# Patient Record
Sex: MALE | Race: BLACK OR AFRICAN AMERICAN | URBAN - METROPOLITAN AREA
[De-identification: names, ages, dates, MRNs, and addresses within clinical notes are randomized per-mention and may not be internally consistent; named-entity substitution may affect disease eponyms.]

---

## 2017-02-22 ENCOUNTER — HOSPITAL ENCOUNTER (OUTPATIENT)
Dept: LAB | Age: 67
Discharge: HOME | End: 2017-02-22
Payer: MEDICARE

## 2017-02-22 ENCOUNTER — OFFICE VISIT (OUTPATIENT)
Dept: FAMILY MEDICINE CLINIC | Age: 67
End: 2017-02-22

## 2017-02-22 VITALS
TEMPERATURE: 96.1 F | RESPIRATION RATE: 18 BRPM | DIASTOLIC BLOOD PRESSURE: 85 MMHG | SYSTOLIC BLOOD PRESSURE: 147 MMHG | HEART RATE: 110 BPM | BODY MASS INDEX: 21.92 KG/M2 | WEIGHT: 165.4 LBS | HEIGHT: 73 IN | OXYGEN SATURATION: 97 %

## 2017-02-22 DIAGNOSIS — E11.9 CONTROLLED TYPE 2 DIABETES MELLITUS WITHOUT COMPLICATION, WITHOUT LONG-TERM CURRENT USE OF INSULIN (HCC): ICD-10-CM

## 2017-02-22 DIAGNOSIS — E78.00 PURE HYPERCHOLESTEROLEMIA: Primary | ICD-10-CM

## 2017-02-22 LAB — HBA1C MFR BLD HPLC: 5.4 %

## 2017-02-22 PROCEDURE — 85025 COMPLETE CBC W/AUTO DIFF WBC: CPT

## 2017-02-22 PROCEDURE — 80061 LIPID PANEL: CPT

## 2017-02-22 PROCEDURE — 36415 COLL VENOUS BLD VENIPUNCTURE: CPT

## 2017-02-22 RX ORDER — FOLIC ACID 1 MG/1
1 TABLET ORAL DAILY
Qty: 90 TAB | Refills: 3 | Status: SHIPPED | OUTPATIENT
Start: 2017-02-22

## 2017-02-22 NOTE — MR AVS SNAPSHOT
Visit Information Date & Time Provider Department Dept. Phone Encounter #  
 2/22/2017 11:45 AM Precious Pennington MD [facility] [phone] [id number] Follow-up Instructions Return in about 6 months (around 8/22/2017). Upcoming Health Maintenance Date Due  
 EYE EXAM RETINAL OR DILATED Q1 7/1/2015 GLAUCOMA SCREENING Q2Y 7/1/2016 HEMOGLOBIN A1C Q6M 5/22/2017 Pneumococcal 65+ Low/Medium Risk (2 of 2 - PPSV23) 5/24/2017 MEDICARE YEARLY EXAM 5/25/2017 MICROALBUMIN Q1 11/22/2017 LIPID PANEL Q1 11/22/2017 FOOT EXAM Q1 2/22/2018 DTaP/Tdap/Td series (2 - Td) 11/22/2026 COLONOSCOPY 2/22/2027 Allergies as of 2/22/2017  Review Complete On: 2/22/2017 By: Precious Pennington MD  
 No Known Allergies Current Immunizations  Reviewed on 5/24/2016 No immunizations on file. Not reviewed this visit You Were Diagnosed With   
  
 Codes Comments Pure hypercholesterolemia    -  Primary ICD-10-CM: E78.00 ICD-9-CM: 272.0 Controlled type 2 diabetes mellitus without complication, without long-term current use of insulin ([facility]     ICD-10-CM: E11.9 ICD-9-CM: 250.00 Vitals BP  
  
  
  
  
  
 147/85 Vitals History BMI and BSA Data Body Mass Index Body Surface Area  
 21.82 kg/m 2 1.97 m 2 Preferred Pharmacy Pharmacy Name Phone [id number] [address] [phone] Your Updated Medication List  
  
   
This list is accurate as of: 2/22/17 12:51 PM.  Always use your most recent med list.  
  
  
  
  
 anagrelide 0.5 mg capsule Commonly known as:  Hadley Billow Take  by mouth two (2) times a day. aspirin delayed-release 81 mg tablet Take  by mouth daily. cyanocobalamin 1,000 mcg tablet Take  by mouth daily. folic acid 1 mg tablet Commonly known as:  Google Take 1 Tab by mouth daily. furosemide 40 mg tablet Commonly known as:  LASIX TAKE 1-2 TABLET BY MOUTH EVERY DAY AS NEEDED FOR FLUID Iron 325 mg (65 mg iron) tablet Generic drug:  ferrous sulfate Take  by mouth two (2) times a day. lovastatin 20 mg tablet Commonly known as:  MEVACOR Take 1 Tab by mouth daily. For cholesterol  
  
 metFORMIN 500 mg tablet Commonly known as:  GLUCOPHAGE Take 1 Tab by mouth two (2) times daily (with meals). For diabetes PHENobarbital 64.8 mg tablet Commonly known as:  LUMINAL Take  by mouth two (2) times a day. phenytoin  mg ER capsule Commonly known as:  DILANTIN ER Take 1 Cap by mouth two (2) times a day. STOOL SOFTENER 100 mg capsule Generic drug:  docusate sodium Take 100 mg by mouth two (2) times a day. VITAMIN D2 50,000 unit capsule Generic drug:  ergocalciferol 50,000 Units daily. Prescriptions Sent to Pharmacy Refills  
 folic acid (FOLVITE) 1 mg tablet 3 Sig: Take 1 Tab by mouth daily. Class: Normal  
 Pharmacy: [facility]/pharmacy [address] [place] [address] [address] #: [phone] Route: Oral  
  
We Performed the Following AMB POC HEMOGLOBIN A1C [08856 CPT(R)] CBC WITH AUTOMATED DIFF [13830 CPT(R)] LIPID PANEL [33547 CPT(R)] Follow-up Instructions Return in about 6 months (around 8/22/2017). Introducing [facility]! Zeke Page introduces [employer] patient portal. Now you can access parts of your medical record, email your doctor's office, and request medication refills online. 1. In your internet browser, go to [email or web address] 2. Click on the First Time User? Click Here link in the Sign In box. You will see the New Member Sign Up page. 3. Enter your [employer] Access Code exactly as it appears below. You will not need to use this code after youve completed the sign-up process.  If you do not sign up before the expiration date, you must request a new code. · [employer] Access Code: [email or web address] Expires: 5/23/2017 12:50 PM 
 
4. Enter the last four digits of your Social Security Number (xxxx) and Date of Birth (mm/dd/yyyy) as indicated and click Submit. You will be taken to the next sign-up page. 5. Create a [employer] ID. This will be your [employer] login ID and cannot be changed, so think of one that is secure and easy to remember. 6. Create a [employer] password. You can change your password at any time. 7. Enter your Password Reset Question and Answer. This can be used at a later time if you forget your password. 8. Enter your e-mail address. You will receive e-mail notification when new information is available in [address] 9. Click Sign Up. You can now view and download portions of your medical record. 10. Click the Download Summary menu link to download a portable copy of your medical information. If you have questions, please visit the Frequently Asked Questions section of the [employer] website. Remember, [employer] is NOT to be used for urgent needs. For medical emergencies, dial 911. Now available from your iPhone and Android! Please provide this summary of care documentation to your next provider. Your primary care clinician is listed as Tigre Byrnes. If you have any questions after today's visit, please call [phone].

## 2017-02-22 NOTE — PROGRESS NOTES
HISTORY OF PRESENT ILLNESS  Tracy Jean is a 78 y.o. male. HPI No complaints of chest pain, shortness of breath, TIAs, claudication or edema. Has lost 11 lbs since last seen. Has been walking every day, for 30-60 minutes. Has also been dieting. No complaints of chest pain, shortness of breath, TIAs, claudication or edema. Blood sugars have been around 150. No hypoglycemic episodes. ROS    Physical Exam   Constitutional: He appears well-developed and well-nourished. HENT:   Right Ear: External ear normal.   Left Ear: External ear normal.   Mouth/Throat: Oropharynx is clear and moist.   Neck: No thyromegaly present. Cardiovascular: Normal rate, regular rhythm, normal heart sounds and intact distal pulses. Pulmonary/Chest: Effort normal and breath sounds normal. No respiratory distress. He has no wheezes. Abdominal: Soft. Bowel sounds are normal. He exhibits no distension and no mass. There is no tenderness. There is no guarding. Musculoskeletal: Normal range of motion. He exhibits no edema. Lymphadenopathy:     He has no cervical adenopathy. Nursing note and vitals reviewed. ASSESSMENT and PLAN  Orders Placed This Encounter    CBC WITH AUTOMATED DIFF    LIPID PANEL    AMB POC HEMOGLOBIN Y2L    folic acid (FOLVITE) 1 mg tablet     Olga Tellez was seen today for diabetes and cholesterol problem. Diagnoses and all orders for this visit:    Pure hypercholesterolemia  -     LIPID PANEL    Controlled type 2 diabetes mellitus without complication, without long-term current use of insulin (HCC)  -     CBC WITH AUTOMATED DIFF  -     AMB POC HEMOGLOBIN A1C    Other orders  -     Cancel: METABOLIC PANEL, COMPREHENSIVE  -     folic acid (FOLVITE) 1 mg tablet; Take 1 Tab by mouth daily. Follow-up Disposition:  Return in about 6 months (around 8/22/2017). DC glipizide.  PT commended on weight loss

## 2017-02-22 NOTE — PROGRESS NOTES
Chief Complaint   Patient presents with    Diabetes    Cholesterol Problem     Pt here for 3 month follow up    Discussed health maintenance with pt. Pt aware eye exam due.

## 2017-02-23 ENCOUNTER — TELEPHONE (OUTPATIENT)
Dept: FAMILY MEDICINE CLINIC | Age: 67
End: 2017-02-23

## 2017-02-23 LAB
BASOPHILS # BLD AUTO: 0 X10E3/UL (ref 0–0.2)
BASOPHILS NFR BLD AUTO: 0 %
CHOLEST SERPL-MCNC: 218 MG/DL (ref 100–199)
EOSINOPHIL # BLD AUTO: 0.1 X10E3/UL (ref 0–0.4)
EOSINOPHIL NFR BLD AUTO: 1 %
ERYTHROCYTE [DISTWIDTH] IN BLOOD BY AUTOMATED COUNT: 15.9 % (ref 12.3–15.4)
HCT VFR BLD AUTO: 35.1 % (ref 37.5–51)
HDLC SERPL-MCNC: 98 MG/DL
HGB BLD-MCNC: 11.4 G/DL (ref 12.6–17.7)
IMM GRANULOCYTES # BLD: 0 X10E3/UL (ref 0–0.1)
IMM GRANULOCYTES NFR BLD: 0 %
INTERPRETATION, 910389: NORMAL
LDLC SERPL CALC-MCNC: 101 MG/DL (ref 0–99)
LYMPHOCYTES # BLD AUTO: 0.9 X10E3/UL (ref 0.7–3.1)
LYMPHOCYTES NFR BLD AUTO: 13 %
MCH RBC QN AUTO: 28.4 PG (ref 26.6–33)
MCHC RBC AUTO-ENTMCNC: 32.5 G/DL (ref 31.5–35.7)
MCV RBC AUTO: 87 FL (ref 79–97)
MONOCYTES # BLD AUTO: 0.7 X10E3/UL (ref 0.1–0.9)
MONOCYTES NFR BLD AUTO: 10 %
NEUTROPHILS # BLD AUTO: 5.3 X10E3/UL (ref 1.4–7)
NEUTROPHILS NFR BLD AUTO: 76 %
PLATELET # BLD AUTO: 335 X10E3/UL (ref 150–379)
RBC # BLD AUTO: 4.02 X10E6/UL (ref 4.14–5.8)
TRIGL SERPL-MCNC: 93 MG/DL (ref 0–149)
VLDLC SERPL CALC-MCNC: 19 MG/DL (ref 5–40)
WBC # BLD AUTO: 7 X10E3/UL (ref 3.4–10.8)

## 2017-02-23 NOTE — TELEPHONE ENCOUNTER
Called [facility] and verifed that they have the correct order. Asked [email or web address] Pharm tech to call pt and ease his mind and let him know that they have the correct order from Dr. Soares Daily for the Folic Acid. Pharm tech verbalized understanding and said she would call pt.

## 2017-02-23 NOTE — TELEPHONE ENCOUNTER
----- Message from Emerald Sanchez sent at 2/23/2017  2:53 PM EST -----  Regarding: Dr. Sander Harding: [phone]  Note:  Pt stated that he would like a call back in regards to his prescriptions. He stated that he is normally prescribed Folic Acid. However, that is not what he was prescribed the most prescription that was put in. His best contact number is [phone].

## 2017-08-28 ENCOUNTER — HOSPITAL ENCOUNTER (OUTPATIENT)
Dept: LAB | Age: 67
Discharge: HOME | End: 2017-08-28
Payer: MEDICARE

## 2017-08-28 ENCOUNTER — OFFICE VISIT (OUTPATIENT)
Dept: FAMILY MEDICINE CLINIC | Age: 67
End: 2017-08-28

## 2017-08-28 ENCOUNTER — PATIENT OUTREACH (OUTPATIENT)
Dept: FAMILY MEDICINE CLINIC | Age: 67
End: 2017-08-28

## 2017-08-28 VITALS
BODY MASS INDEX: 20.36 KG/M2 | TEMPERATURE: 98.1 F | RESPIRATION RATE: 14 BRPM | DIASTOLIC BLOOD PRESSURE: 68 MMHG | OXYGEN SATURATION: 91 % | WEIGHT: 153.6 LBS | SYSTOLIC BLOOD PRESSURE: 148 MMHG | HEART RATE: 90 BPM | HEIGHT: 73 IN

## 2017-08-28 DIAGNOSIS — E11.9 DIABETES MELLITUS WITH NO COMPLICATION (HCC): Primary | ICD-10-CM

## 2017-08-28 DIAGNOSIS — E78.00 PURE HYPERCHOLESTEROLEMIA: ICD-10-CM

## 2017-08-28 DIAGNOSIS — Z12.5 SPECIAL SCREENING FOR MALIGNANT NEOPLASM OF PROSTATE: ICD-10-CM

## 2017-08-28 DIAGNOSIS — Z00.00 MEDICARE ANNUAL WELLNESS VISIT, SUBSEQUENT: ICD-10-CM

## 2017-08-28 LAB — HBA1C MFR BLD HPLC: 6.9 %

## 2017-08-28 PROCEDURE — 36415 COLL VENOUS BLD VENIPUNCTURE: CPT

## 2017-08-28 PROCEDURE — 80061 LIPID PANEL: CPT

## 2017-08-28 PROCEDURE — 80053 COMPREHEN METABOLIC PANEL: CPT

## 2017-08-28 PROCEDURE — 85025 COMPLETE CBC W/AUTO DIFF WBC: CPT

## 2017-08-28 RX ORDER — HYDROCHLOROTHIAZIDE 12.5 MG/1
12.5 TABLET ORAL DAILY
Qty: 90 TAB | Refills: 3 | Status: SHIPPED | OUTPATIENT
Start: 2017-08-28

## 2017-08-28 RX ORDER — GLUCOSAMINE SULFATE 1500 MG
POWDER IN PACKET (EA) ORAL
Refills: 2 | COMMUNITY
Start: 2017-08-01 | End: 2017-10-31 | Stop reason: SDUPTHER

## 2017-08-28 NOTE — MR AVS SNAPSHOT
Visit Information Date & Time Provider Department Dept. Phone Encounter #  
 8/28/2017 12:00 PM Rhiannon Vaughn MD [facility] [phone] [id number] Follow-up Instructions Return in about 6 months (around 2/28/2018). Upcoming Health Maintenance Date Due  
 EYE EXAM RETINAL OR DILATED Q1 7/1/2015 GLAUCOMA SCREENING Q2Y 7/1/2016 MEDICARE YEARLY EXAM 5/25/2017 HEMOGLOBIN A1C Q6M 8/22/2017 MICROALBUMIN Q1 11/22/2017 FOOT EXAM Q1 2/22/2018 LIPID PANEL Q1 2/22/2018 DTaP/Tdap/Td series (2 - Td) 11/22/2026 COLONOSCOPY 2/22/2027 Allergies as of 8/28/2017  Review Complete On: 8/28/2017 By: Rhiannon Vaughn MD  
 No Known Allergies Current Immunizations  Reviewed on 5/24/2016 No immunizations on file. Not reviewed this visit You Were Diagnosed With   
  
 Codes Comments Diabetes mellitus with no complication (HCC)    -  Primary ICD-10-CM: E11.9 ICD-9-CM: 250.00 Pure hypercholesterolemia     ICD-10-CM: E78.00 ICD-9-CM: 272.0 Special screening for malignant neoplasm of prostate     ICD-10-CM: Z12.5 ICD-9-CM: V76.44 Vitals BP Pulse Temp Resp Height(growth percentile) Weight(growth percentile) 148/68 90 98.1 °F (36.7 °C) (Oral) 14 6' 1\" (1.854 m) 153 lb 9.6 oz (69.7 kg) SpO2 BMI Smoking Status 91% 20.27 kg/m2 Former Smoker BMI and BSA Data Body Mass Index Body Surface Area  
 20.27 kg/m 2 1.89 m 2 Preferred Pharmacy Pharmacy Name Phone [id number] [place], [place] - [address] [phone] Your Updated Medication List  
  
   
This list is accurate as of: 8/28/17  1:28 PM.  Always use your most recent med list.  
  
  
  
  
 anagrelide 0.5 mg capsule Commonly known as:  Haley Linger Take  by mouth two (2) times a day. aspirin delayed-release 81 mg tablet Take  by mouth daily. cholecalciferol 1,000 unit Cap Commonly known as:  VITAMIN D3  
TAKE ONE CAPSULE BY MOUTH EVERY DAY  
  
 cyanocobalamin 1,000 mcg tablet Take  by mouth daily. folic acid 1 mg tablet Commonly known as:  Google Take 1 Tab by mouth daily. furosemide 40 mg tablet Commonly known as:  LASIX TAKE 1-2 TABLET BY MOUTH EVERY DAY AS NEEDED FOR FLUID  
  
 hydroCHLOROthiazide 12.5 mg tablet Commonly known as:  HYDRODIURIL Take 1 Tab by mouth daily. For blood pressure Iron 325 mg (65 mg iron) tablet Generic drug:  ferrous sulfate Take  by mouth two (2) times a day. lovastatin 40 mg tablet Commonly known as:  MEVACOR Take 1 Tab by mouth daily. For cholesterol  
  
 metFORMIN 500 mg tablet Commonly known as:  GLUCOPHAGE Take 1 Tab by mouth two (2) times daily (with meals). For diabetes PHENobarbital 64.8 mg tablet Commonly known as:  LUMINAL Take  by mouth two (2) times a day. phenytoin  mg ER capsule Commonly known as:  DILANTIN ER Take 1 Cap by mouth two (2) times a day. STOOL SOFTENER 100 mg capsule Generic drug:  docusate sodium Take 100 mg by mouth two (2) times a day. VITAMIN D2 50,000 unit capsule Generic drug:  ergocalciferol 50,000 Units daily. Prescriptions Sent to Pharmacy Refills  
 hydroCHLOROthiazide (HYDRODIURIL) 12.5 mg tablet 3 Sig: Take 1 Tab by mouth daily. For blood pressure Class: Normal  
 Pharmacy: [facility]/pharmacy [id number] [place] [address] [address] [facility] #: [phone] Route: Oral  
  
We Performed the Following AMB POC HEMOGLOBIN A1C [00259 CPT(R)] CBC WITH AUTOMATED DIFF [52541 CPT(R)] LIPID PANEL [05089 CPT(R)] METABOLIC PANEL, COMPREHENSIVE [12944 CPT(R)] Follow-up Instructions Return in about 6 months (around 2/28/2018). Patient Instructions Medicare Wellness Visit, Male The best way to live healthy is to have a healthy lifestyle by eating a well-balanced diet, exercising regularly, limiting alcohol and stopping smoking. Regular physical exams and screening tests are another way to keep healthy. Preventive exams provided by your health care provider can find health problems before they become diseases or illnesses. Preventive services including immunizations, screening tests, monitoring and exams can help you take care of your own health. All people over age 72 should have a pneumovax  and and a prevnar shot to prevent pneumonia. These are once in a lifetime unless you and your provider decide differently. All people over 65 should have a yearly flu shot and a tetanus vaccine every 10 years. Screening for diabetes mellitus with a blood sugar test should be done every year. Glaucoma is a disease of the eye due to increased ocular pressure that can lead to blindness and it should be done every year by an eye professional. 
 
Cardiovascular screening tests that check for elevated lipids (fatty part of blood) which can lead to heart disease and strokes should be done every 5 years. Colorectal screening that evaluates for blood or polyps in your colon should be done yearly as a stool test or every five years as a flexible sigmoidoscope or every 10 years as a colonoscopy up to age 76. Men up to age 76 may need a screening blood test for prostate cancer at certain intervals, depending on their personal and family history. This decision is between the patient and his provider. If you have been a smoker or had family history of abdominal aortic aneurysms, you and your provider may decide to schedule an ultrasound test of your aorta. Hepatitis C screening is also recommended for anyone born between [birth year] through [address]. A shingles vaccine is also recommended once in a lifetime after age 61. Your Medicare Wellness Exam is recommended annually. Here is a list of your current Health Maintenance items with a due date: 
Health Maintenance Due Topic Date Due [employer] Eye Exam  07/01/2015  Glaucoma Screening   07/01/2016 [employer] Annual Well Visit  05/25/2017  Hemoglobin A1C    08/22/2017 Introducing [facility]! Madi Valenzuela introduces [employer] patient portal. Now you can access parts of your medical record, email your doctor's office, and request medication refills online. 1. In your internet browser, go to [email or web address] 2. Click on the First Time User? Click Here link in the Sign In box. You will see the New Member Sign Up page. 3. Enter your [employer] Access Code exactly as it appears below. You will not need to use this code after youve completed the sign-up process. If you do not sign up before the expiration date, you must request a new code. · [employer] Access Code: [email or web address] Expires: 11/26/2017 12:46 PM 
 
4. Enter the last four digits of your Social Security Number (xxxx) and Date of Birth (mm/dd/yyyy) as indicated and click Submit. You will be taken to the next sign-up page. 5. Create a [employer] ID. This will be your [employer] login ID and cannot be changed, so think of one that is secure and easy to remember. 6. Create a [employer] password. You can change your password at any time. 7. Enter your Password Reset Question and Answer. This can be used at a later time if you forget your password. 8. Enter your e-mail address. You will receive e-mail notification when new information is available in [address] 9. Click Sign Up. You can now view and download portions of your medical record. 10. Click the Download Summary menu link to download a portable copy of your medical information. If you have questions, please visit the Frequently Asked Questions section of the [employer] website. Remember, [employer] is NOT to be used for urgent needs. For medical emergencies, dial 911. Now available from your iPhone and Android! Please provide this summary of care documentation to your next provider. Your primary care clinician is listed as Tai Nicole. If you have any questions after today's visit, please call [phone].

## 2017-08-28 NOTE — PATIENT INSTRUCTIONS
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
## 2017-08-28 NOTE — PROGRESS NOTES
HISTORY OF PRESENT ILLNESS  Milad Hutchinson is a 75 y.o. male. HPI Pt. Comes in for blood pressure and cholesterol check. Needs sugar checked also. No complaints of chest pain, shortness of breath, TIAs, claudication or edema. Blood sugars have been less than 100. No hypoglycemic episodes. Needs toenails trimmed. Past Medical History:   Diagnosis Date    Pure hypercholesterolemia 2/15/2010    S/P lumbar spinal fusion 2/15/2010    Seizures ([facility] [address] 2/15/2010    Followed by Dr Lazaro Storey, last seizure in 1997    Syncope 04/2006    Pt was hospitalized for syncope. 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 3 children , aged 36, 24 and 21. Current Outpatient Prescriptions   Medication Sig Dispense Refill    cholecalciferol (VITAMIN D3) 1,000 unit cap TAKE ONE CAPSULE BY MOUTH EVERY DAY  2    hydroCHLOROthiazide (HYDRODIURIL) 12.5 mg tablet Take 1 Tab by mouth daily. For blood pressure 90 Tab 3    lovastatin (MEVACOR) 40 mg tablet Take 1 Tab by mouth daily. For cholesterol 90 Tab 3    folic acid (FOLVITE) 1 mg tablet Take 1 Tab by mouth daily. 90 Tab 3    metFORMIN (GLUCOPHAGE) 500 mg tablet Take 1 Tab by mouth two (2) times daily (with meals). For diabetes 180 Tab 12    furosemide (LASIX) 40 mg tablet TAKE 1-2 TABLET BY MOUTH EVERY DAY AS NEEDED FOR FLUID 60 Tab 12    phenytoin ER (DILANTIN ER) 100 mg ER capsule Take 1 Cap by mouth two (2) times a day.  60 Cap 0  cyanocobalamin 1,000 mcg tablet Take  by mouth daily. 3    VITAMIN D2 50,000 unit capsule 50,000 Units daily. 0    anagrelide (AGRYLIN) 0.5 mg capsule Take  by mouth two (2) times a day.  PHENobarbital (LUMINAL) 64.8 mg tablet Take  by mouth two (2) times a day. 5    aspirin delayed-release 81 mg tablet Take  by mouth daily.  docusate sodium (STOOL SOFTENER) 100 mg capsule Take 100 mg by mouth two (2) times a day.  ferrous sulfate (IRON) 325 mg (65 mg iron) tablet Take  by mouth two (2) times a day. ROS    Physical Exam   Constitutional: He appears well-developed and well-nourished. HENT:   Right Ear: External ear normal.   Left Ear: External ear normal.   Mouth/Throat: Oropharynx is clear and moist.   Neck: No thyromegaly present. Cardiovascular: Normal rate, regular rhythm, normal heart sounds and intact distal pulses. Pulmonary/Chest: Effort normal and breath sounds normal. No respiratory distress. He has no wheezes. Abdominal: Soft. Bowel sounds are normal. He exhibits no distension and no mass. There is no tenderness. There is no guarding. Musculoskeletal: Normal range of motion. He exhibits no edema. Lymphadenopathy:     He has no cervical adenopathy. Skin:   Sensation feet intact. Multiple elongated , curved toenails   Nursing note and vitals reviewed. ASSESSMENT and PLAN  Orders Placed This Encounter    CBC WITH AUTOMATED DIFF    METABOLIC PANEL, COMPREHENSIVE    LIPID PANEL    AMB POC HEMOGLOBIN A1C    hydroCHLOROthiazide (HYDRODIURIL) 12.5 mg tablet     Diagnoses and all orders for this visit:    1. Diabetes mellitus with no complication (HCC)  -     AMB POC HEMOGLOBIN A1C  -     CBC WITH AUTOMATED DIFF  -     METABOLIC PANEL, COMPREHENSIVE  -     LIPID PANEL    2. Pure hypercholesterolemia  -     AMB POC HEMOGLOBIN A1C  -     CBC WITH AUTOMATED DIFF  -     METABOLIC PANEL, COMPREHENSIVE  -     LIPID PANEL    3.  Special screening for malignant neoplasm of prostate    4. Medicare annual wellness visit, subsequent    Other orders  -     hydroCHLOROthiazide (HYDRODIURIL) 12.5 mg tablet; Take 1 Tab by mouth daily. For blood pressure      Follow-up Disposition:  Return in about 6 months (around 2/28/2018).   Toenails trimmed

## 2017-08-28 NOTE — PROGRESS NOTES
Chief Complaint   Patient presents with    Diabetes    Cholesterol Problem     1. Have you been to the ER, urgent care clinic since your last visit? Hospitalized since your last visit? No    2. Have you seen or consulted any other health care providers outside of the [address] [place] [address] since your last visit? Include any pap smears or colon screening.  No       Health Maintenance Due   Topic Date Due    EYE EXAM RETINAL OR DILATED Q1  07/01/2015    GLAUCOMA SCREENING Q2Y  07/01/2016    MEDICARE YEARLY EXAM  05/25/2017    HEMOGLOBIN A1C Q6M  08/22/2017       Pt refused retinal exam

## 2017-08-29 NOTE — PROGRESS NOTES
pc with pt. Was seen at [facility] hematology today. Had a blood transfusion, and is back on anagrelide.

## 2017-08-30 LAB
ALBUMIN SERPL-MCNC: 4.1 G/DL (ref 3.6–4.8)
ALBUMIN/GLOB SERPL: 1.2 {RATIO} (ref 1.2–2.2)
ALP SERPL-CCNC: 120 IU/L (ref 39–117)
ALT SERPL-CCNC: 11 IU/L (ref 0–44)
AST SERPL-CCNC: 13 IU/L (ref 0–40)
BASOPHILS # BLD AUTO: 0.1 X10E3/UL (ref 0–0.2)
BASOPHILS NFR BLD AUTO: 1 %
BILIRUB SERPL-MCNC: <0.2 MG/DL (ref 0–1.2)
BUN SERPL-MCNC: 34 MG/DL (ref 8–27)
BUN/CREAT SERPL: 29 (ref 10–24)
CALCIUM SERPL-MCNC: 9.3 MG/DL (ref 8.6–10.2)
CHLORIDE SERPL-SCNC: 98 MMOL/L (ref 96–106)
CHOLEST SERPL-MCNC: 180 MG/DL (ref 100–199)
CO2 SERPL-SCNC: 21 MMOL/L (ref 18–29)
CREAT SERPL-MCNC: 1.16 MG/DL (ref 0.76–1.27)
EOSINOPHIL # BLD AUTO: 0.2 X10E3/UL (ref 0–0.4)
EOSINOPHIL NFR BLD AUTO: 2 %
ERYTHROCYTE [DISTWIDTH] IN BLOOD BY AUTOMATED COUNT: 24.1 % (ref 12.3–15.4)
GLOBULIN SER CALC-MCNC: 3.3 G/DL (ref 1.5–4.5)
GLUCOSE SERPL-MCNC: 137 MG/DL (ref 65–99)
HCT VFR BLD AUTO: 26.2 % (ref 37.5–51)
HDLC SERPL-MCNC: 108 MG/DL
HGB BLD-MCNC: 7.2 G/DL (ref 12.6–17.7)
IMM GRANULOCYTES # BLD: 0 X10E3/UL (ref 0–0.1)
IMM GRANULOCYTES NFR BLD: 0 %
INTERPRETATION, 910389: NORMAL
LDLC SERPL CALC-MCNC: 56 MG/DL (ref 0–99)
LYMPHOCYTES # BLD AUTO: 1.4 X10E3/UL (ref 0.7–3.1)
LYMPHOCYTES NFR BLD AUTO: 18 %
Lab: NORMAL
MCH RBC QN AUTO: 20.7 PG (ref 26.6–33)
MCHC RBC AUTO-ENTMCNC: 27.5 G/DL (ref 31.5–35.7)
MCV RBC AUTO: 75 FL (ref 79–97)
MONOCYTES # BLD AUTO: 0.8 X10E3/UL (ref 0.1–0.9)
MONOCYTES NFR BLD AUTO: 11 %
MORPHOLOGY BLD-IMP: ABNORMAL
NEUTROPHILS # BLD AUTO: 5.4 X10E3/UL (ref 1.4–7)
NEUTROPHILS NFR BLD AUTO: 68 %
PLATELET # BLD AUTO: 1033 X10E3/UL (ref 150–379)
POTASSIUM SERPL-SCNC: 6.2 MMOL/L (ref 3.5–5.2)
PROT SERPL-MCNC: 7.4 G/DL (ref 6–8.5)
RBC # BLD AUTO: 3.48 X10E6/UL (ref 4.14–5.8)
SODIUM SERPL-SCNC: 141 MMOL/L (ref 134–144)
TRIGL SERPL-MCNC: 80 MG/DL (ref 0–149)
VLDLC SERPL CALC-MCNC: 16 MG/DL (ref 5–40)
WBC # BLD AUTO: 7.9 X10E3/UL (ref 3.4–10.8)

## 2017-09-05 NOTE — PROGRESS NOTES
pc with pt. Is seeing hematology at [facility] tomorrow, and was seen on Friday. Also to make appt with me on Thursday for repeat potassium.

## 2017-09-12 ENCOUNTER — TELEPHONE (OUTPATIENT)
Dept: FAMILY MEDICINE CLINIC | Age: 67
End: 2017-09-12

## 2017-10-31 DIAGNOSIS — E78.00 PURE HYPERCHOLESTEROLEMIA: ICD-10-CM

## 2017-10-31 DIAGNOSIS — E11.9 DIABETES MELLITUS WITH NO COMPLICATION (HCC): ICD-10-CM

## 2017-10-31 RX ORDER — GLUCOSAMINE SULFATE 1500 MG
POWDER IN PACKET (EA) ORAL
Qty: 90 CAP | Refills: 3 | Status: SHIPPED | OUTPATIENT
Start: 2017-10-31

## 2018-01-08 RX ORDER — METFORMIN HYDROCHLORIDE 500 MG/1
500 TABLET ORAL 2 TIMES DAILY WITH MEALS
Qty: 180 TAB | Refills: 3 | Status: SHIPPED | OUTPATIENT
Start: 2018-01-08